# Patient Record
(demographics unavailable — no encounter records)

---

## 2025-07-29 NOTE — PHYSICAL EXAM
[de-identified] : Examination of the right foot and ankle is as follows: INSPECTION: incisions well healed, clean and dry without drainage, no erythema, no ankle swelling, no ecchymosis, no abrasion, laceration, no erythema PALPATION: anterior joint line tenderness ROM: plantarflexion 40 degrees, inversion 30 degrees, eversion 20 degrees, dorsiflexion 20 degrees STRENGTH: dorsiflexion 5/5, plantar flexion 5/5, inversion 5/5, eversion 5/5, EHL 5/5, FHL 5/5 TESTING: - anterior drawer VASCULAR: dorsalis pedis pulse: 2+, posterior tibialis pulse: 2+ NEURO: Sensation present to light touch in all distributions GAIT: non-antalgic, patient ambulates without assistive devices  Xrays: right ankle 3 views ap/lat/obl - mild distal tibial osteophyte, no acute osseous abnormalities

## 2025-07-29 NOTE — HISTORY OF PRESENT ILLNESS
[Sudden] : sudden [0] : 0 [Intermittent] : intermittent [Rest] : rest [Not working due to injury] : Work status: not working due to injury [de-identified] : Patient is here today for right ankle pain. Patient reports that over the past year he has had multiple instances of the right ankle feeling as if he will fall/give out. Patient reports that there has been no notable injuries in this time frame. Patient reports that the majority of the pain he experiences is anterior ankle and will travel medial and lateral. Patient reports use of Advil PRN. Patient is S/P Right Ankle Arthroscopy with Extensive Debridement and Lateral Ligament Reconstruction on 12/6/23.  Patient states he stopped PT 2/2024. Patient states he has no pain and no complaints in the ankle.    [] : Post Surgical Visit: no [FreeTextEntry3] : 8/2023 [FreeTextEntry5] : Patient reports intermittent ankle instability.  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

## 2025-07-29 NOTE — PROCEDURE
[Medium Joint Injection] : medium joint injection [Right] : of the right [Pain] : pain [Alcohol] : alcohol [___ cc    3mg] :  Betamethasone (Celestone) ~Vcc of 3mg [___ cc    1%] : Lidocaine ~Vcc of 1%  [] : Patient tolerated procedure well [Call if redness, pain or fever occur] : call if redness, pain or fever occur [Apply ice for 15min out of every hour for the next 12-24 hours as tolerated] : apply ice for 15 minutes out of every hour for the next 12-24 hours as tolerated [Previous OTC use and PT nontherapeutic] : patient has tried OTC's including aspirin, Ibuprofen, Aleve, etc or prescription NSAIDS, and/or exercises at home and/or physical therapy without satisfactory response [Patient had decreased mobility in the joint] : patient had decreased mobility in the joint [Risks, benefits, alternatives discussed / Verbal consent obtained] : the risks benefits, and alternatives have been discussed, and verbal consent was obtained

## 2025-07-29 NOTE — ASSESSMENT
[FreeTextEntry1] : 57 yo male presenting s/p right ankle arthroscopy with extensive debridement and lateral ligament reconstruction on 12/6/23. Patient overall happy with surgical outcome thus far. Patient notes on occasion he has pain in anterior joint line but it is mild and doesn't limit him in his day-to-day activities.  Patient interested in CSI today.  -right ankle joint CSI given in office without complications and well tolerated. -Continue HEP -Activities as tolerated, no restrictions -Rest, ice, compression, elevation, NSAIDs PRN for pain.  -All questions answered -F/u PRN  The diagnosis was explained in detail. The potential non-surgical and surgical treatments were reviewed. The relative risks and benefits of each option were considered relative to the patients age, activity level, medical history, symptom severity and previously attempted treatments.  The patient was advised to consult with their primary medical provider prior to initiation of any new medications to reduce the risk of adverse effects specific to their long-term home medications and medical history. The risk of gastrointestinal irritation and kidney injury specific to long-term NSAID use was discussed.  Entered by Jonas Hardin PA-C acting as scribe. Dr. Dong Attestation The documentation recorded by the scribe, in my presence, accurately reflects the service I, Dr. Dong, personally performed, and the decisions made by me with my edits as appropriate.